# Patient Record
Sex: FEMALE | Race: WHITE | NOT HISPANIC OR LATINO | URBAN - METROPOLITAN AREA
[De-identification: names, ages, dates, MRNs, and addresses within clinical notes are randomized per-mention and may not be internally consistent; named-entity substitution may affect disease eponyms.]

---

## 2017-01-05 ENCOUNTER — OFFICE VISIT - HEALTHEAST (OUTPATIENT)
Dept: INTERNAL MEDICINE | Facility: CLINIC | Age: 52
End: 2017-01-05

## 2017-01-05 DIAGNOSIS — J40 BRONCHITIS: ICD-10-CM

## 2017-01-22 ENCOUNTER — COMMUNICATION - HEALTHEAST (OUTPATIENT)
Dept: INTERNAL MEDICINE | Facility: CLINIC | Age: 52
End: 2017-01-22

## 2017-01-22 DIAGNOSIS — I10 HTN (HYPERTENSION): ICD-10-CM

## 2017-01-27 ENCOUNTER — COMMUNICATION - HEALTHEAST (OUTPATIENT)
Dept: INTERNAL MEDICINE | Facility: CLINIC | Age: 52
End: 2017-01-27

## 2017-01-27 DIAGNOSIS — R05.9 COUGH: ICD-10-CM

## 2017-01-27 RX ORDER — ALBUTEROL SULFATE 90 UG/1
2 AEROSOL, METERED RESPIRATORY (INHALATION) EVERY 6 HOURS PRN
Qty: 1 INHALER | Refills: 1 | Status: SHIPPED | OUTPATIENT
Start: 2017-01-27

## 2017-01-31 ENCOUNTER — OFFICE VISIT - HEALTHEAST (OUTPATIENT)
Dept: INTERNAL MEDICINE | Facility: CLINIC | Age: 52
End: 2017-01-31

## 2017-01-31 DIAGNOSIS — R05.9 COUGH: ICD-10-CM

## 2017-02-07 ENCOUNTER — OFFICE VISIT - HEALTHEAST (OUTPATIENT)
Dept: INTERNAL MEDICINE | Facility: CLINIC | Age: 52
End: 2017-02-07

## 2017-02-07 DIAGNOSIS — R05.9 COUGH: ICD-10-CM

## 2017-02-09 ENCOUNTER — COMMUNICATION - HEALTHEAST (OUTPATIENT)
Dept: INTERNAL MEDICINE | Facility: CLINIC | Age: 52
End: 2017-02-09

## 2017-02-09 DIAGNOSIS — R05.9 COUGH: ICD-10-CM

## 2017-02-13 ENCOUNTER — COMMUNICATION - HEALTHEAST (OUTPATIENT)
Dept: ADMINISTRATIVE | Facility: CLINIC | Age: 52
End: 2017-02-13

## 2017-02-13 ENCOUNTER — RECORDS - HEALTHEAST (OUTPATIENT)
Dept: ADMINISTRATIVE | Facility: OTHER | Age: 52
End: 2017-02-13

## 2017-02-16 ENCOUNTER — AMBULATORY - HEALTHEAST (OUTPATIENT)
Dept: PULMONOLOGY | Facility: OTHER | Age: 52
End: 2017-02-16

## 2017-02-16 DIAGNOSIS — R05.9 COUGH: ICD-10-CM

## 2017-02-20 ENCOUNTER — COMMUNICATION - HEALTHEAST (OUTPATIENT)
Dept: PULMONOLOGY | Facility: OTHER | Age: 52
End: 2017-02-20

## 2017-02-22 ENCOUNTER — RECORDS - HEALTHEAST (OUTPATIENT)
Dept: ADMINISTRATIVE | Facility: OTHER | Age: 52
End: 2017-02-22

## 2017-02-22 ENCOUNTER — RECORDS - HEALTHEAST (OUTPATIENT)
Dept: PULMONOLOGY | Facility: OTHER | Age: 52
End: 2017-02-22

## 2017-02-22 DIAGNOSIS — R05.9 COUGH: ICD-10-CM

## 2017-02-24 ENCOUNTER — COMMUNICATION - HEALTHEAST (OUTPATIENT)
Dept: INTERNAL MEDICINE | Facility: CLINIC | Age: 52
End: 2017-02-24

## 2017-02-25 ENCOUNTER — COMMUNICATION - HEALTHEAST (OUTPATIENT)
Dept: INTERNAL MEDICINE | Facility: CLINIC | Age: 52
End: 2017-02-25

## 2017-02-27 ENCOUNTER — AMBULATORY - HEALTHEAST (OUTPATIENT)
Dept: PULMONOLOGY | Facility: OTHER | Age: 52
End: 2017-02-27

## 2017-02-27 ENCOUNTER — OFFICE VISIT - HEALTHEAST (OUTPATIENT)
Dept: PULMONOLOGY | Facility: OTHER | Age: 52
End: 2017-02-27

## 2017-02-27 DIAGNOSIS — K21.9 GERD (GASTROESOPHAGEAL REFLUX DISEASE): ICD-10-CM

## 2017-02-27 DIAGNOSIS — I10 HTN (HYPERTENSION): ICD-10-CM

## 2017-02-27 RX ORDER — LOSARTAN POTASSIUM 50 MG/1
50 TABLET ORAL DAILY
Qty: 30 TABLET | Refills: 3 | Status: SHIPPED | OUTPATIENT
Start: 2017-02-27

## 2017-03-01 ENCOUNTER — COMMUNICATION - HEALTHEAST (OUTPATIENT)
Dept: INTERNAL MEDICINE | Facility: CLINIC | Age: 52
End: 2017-03-01

## 2017-03-02 ENCOUNTER — COMMUNICATION - HEALTHEAST (OUTPATIENT)
Dept: ADMINISTRATIVE | Facility: CLINIC | Age: 52
End: 2017-03-02

## 2017-03-07 ENCOUNTER — COMMUNICATION - HEALTHEAST (OUTPATIENT)
Dept: PULMONOLOGY | Facility: OTHER | Age: 52
End: 2017-03-07

## 2017-03-09 ENCOUNTER — OFFICE VISIT - HEALTHEAST (OUTPATIENT)
Dept: INTERNAL MEDICINE | Facility: CLINIC | Age: 52
End: 2017-03-09

## 2017-03-09 DIAGNOSIS — R21 SKIN RASH: ICD-10-CM

## 2017-03-09 RX ORDER — TIOTROPIUM BROMIDE 18 UG/1
18 CAPSULE ORAL; RESPIRATORY (INHALATION) DAILY
Status: SHIPPED | COMMUNITY
Start: 2017-03-09

## 2017-03-09 RX ORDER — CLOBETASOL PROPIONATE 0.5 MG/G
CREAM TOPICAL
Qty: 45 G | Refills: 0 | Status: SHIPPED | OUTPATIENT
Start: 2017-03-09

## 2017-03-16 ENCOUNTER — COMMUNICATION - HEALTHEAST (OUTPATIENT)
Dept: INTERNAL MEDICINE | Facility: CLINIC | Age: 52
End: 2017-03-16

## 2017-03-20 ENCOUNTER — COMMUNICATION - HEALTHEAST (OUTPATIENT)
Dept: INTERNAL MEDICINE | Facility: CLINIC | Age: 52
End: 2017-03-20

## 2017-03-20 DIAGNOSIS — I10 ESSENTIAL HYPERTENSION: ICD-10-CM

## 2017-03-30 ENCOUNTER — OFFICE VISIT - HEALTHEAST (OUTPATIENT)
Dept: INTERNAL MEDICINE | Facility: CLINIC | Age: 52
End: 2017-03-30

## 2017-03-30 DIAGNOSIS — I10 ESSENTIAL HYPERTENSION WITH GOAL BLOOD PRESSURE LESS THAN 140/90: ICD-10-CM

## 2017-03-30 ASSESSMENT — MIFFLIN-ST. JEOR: SCORE: 2233.25

## 2017-03-31 ENCOUNTER — OFFICE VISIT - HEALTHEAST (OUTPATIENT)
Dept: PULMONOLOGY | Facility: OTHER | Age: 52
End: 2017-03-31

## 2017-03-31 DIAGNOSIS — R05.9 COUGH: ICD-10-CM

## 2017-03-31 DIAGNOSIS — K21.9 GERD (GASTROESOPHAGEAL REFLUX DISEASE): ICD-10-CM

## 2017-04-03 ENCOUNTER — AMBULATORY - HEALTHEAST (OUTPATIENT)
Dept: PULMONOLOGY | Facility: OTHER | Age: 52
End: 2017-04-03

## 2017-07-08 ENCOUNTER — COMMUNICATION - HEALTHEAST (OUTPATIENT)
Dept: INTERNAL MEDICINE | Facility: CLINIC | Age: 52
End: 2017-07-08

## 2017-07-08 DIAGNOSIS — I10 ESSENTIAL HYPERTENSION: ICD-10-CM

## 2017-07-08 RX ORDER — AMLODIPINE BESYLATE 10 MG/1
TABLET ORAL
Qty: 90 TABLET | Refills: 3 | Status: SHIPPED | OUTPATIENT
Start: 2017-07-08

## 2017-07-08 RX ORDER — SPIRONOLACTONE 25 MG/1
TABLET ORAL
Qty: 90 TABLET | Refills: 3 | Status: SHIPPED | OUTPATIENT
Start: 2017-07-08

## 2017-09-14 ENCOUNTER — COMMUNICATION - HEALTHEAST (OUTPATIENT)
Dept: INTERNAL MEDICINE | Facility: CLINIC | Age: 52
End: 2017-09-14

## 2017-09-14 DIAGNOSIS — I10 HTN (HYPERTENSION): ICD-10-CM

## 2017-09-15 RX ORDER — LISINOPRIL 40 MG/1
40 TABLET ORAL DAILY
Qty: 90 TABLET | Refills: 0 | Status: SHIPPED | OUTPATIENT
Start: 2017-09-15 | End: 2018-09-15

## 2019-01-05 ENCOUNTER — COMMUNICATION - HEALTHEAST (OUTPATIENT)
Dept: INTERNAL MEDICINE | Facility: CLINIC | Age: 54
End: 2019-01-05

## 2019-01-24 ENCOUNTER — RECORDS - HEALTHEAST (OUTPATIENT)
Dept: ADMINISTRATIVE | Facility: OTHER | Age: 54
End: 2019-01-24

## 2021-05-30 VITALS — BODY MASS INDEX: 59.83 KG/M2 | WEIGHT: 293 LBS

## 2021-05-30 VITALS — BODY MASS INDEX: 48.82 KG/M2 | HEIGHT: 65 IN | WEIGHT: 293 LBS

## 2021-05-30 VITALS — BODY MASS INDEX: 59.03 KG/M2 | WEIGHT: 293 LBS

## 2021-05-30 VITALS — BODY MASS INDEX: 59.46 KG/M2 | WEIGHT: 293 LBS

## 2021-05-30 VITALS — WEIGHT: 293 LBS | BODY MASS INDEX: 57.44 KG/M2

## 2021-05-30 VITALS — WEIGHT: 293 LBS | BODY MASS INDEX: 57.7 KG/M2

## 2021-06-08 NOTE — PROGRESS NOTES
"ASSESSMENT:    Bronchitis with reactive airway disease.    PLAN:  She is given prescriptions for both doxycycline and prednisone.  She'll follow-up next week if she is not significantly improved.  Problem List Items Addressed This Visit     None      Visit Diagnoses     Cough    -  Primary    Relevant Orders    XR Chest PA and Lateral          There are no discontinued medications.    No Follow-up on file.    There are no Patient Instructions on file for this visit.    CHIEF COMPLAINT:  Chief Complaint   Patient presents with     Cough     cough, wheezing, SOB, fever  x 6/d\"s       HISTORY OF PRESENT ILLNESS:  Radha Pablo is a 51 y.o. female presenting to the clinic today with a cough. She developed a productive cough with a fever for multiple days approximately one month ago. She presented to the clinic on January 5 and was seen by Dr. Irwin, who diagnosed her with bronchitis and treated her with a Flovent inhaler and a course of sulfa antibiotics. She completed her course of antibiotics at that time, as prescribed. Her symptoms improved considerably at that time but never fully resolved. Her symptoms began re-presenting 6 days ago and have progressively worsened since. Her present symptoms include a cough which produces yellow sputum, chest congestion, wheezing, shortness of breath, and frequent yellow rhinorrhea. She is agreeable to receiving a chest x-ray for further evaluation of her symptoms.     REVIEW OF SYSTEMS:   She denies sinus pressure or sinus pain. Her pulse oximetry saturation is currently 93%. All other systems are negative.    PFSH:  Allergies   Allergen Reactions     Hydrochlorothiazide      Annotation: hyponatremia         TOBACCO USE:  History   Smoking Status     Never Smoker   Smokeless Tobacco     Not on file       VITALS:  Vitals:    01/31/17 1344 01/31/17 1412   BP: 130/90    Patient Site: Right Arm    Patient Position: Sitting    Cuff Size: Adult Large    Pulse: 88    Temp: 99  F " (37.2  C)    TempSrc: Oral    SpO2:  93%   Weight: (!) 347 lb 4.8 oz (157.5 kg)      Wt Readings from Last 3 Encounters:   01/31/17 (!) 347 lb 4.8 oz (157.5 kg)   08/29/16 (!) 361 lb (163.7 kg)   07/07/16 (!) 356 lb 9.6 oz (161.8 kg)         PHYSICAL EXAM:  Constitutional:  Reveals an alert and oriented x3, pleasant female with no acute distress.   HEENT: Oropharynx is clear. No pain with percussion over her frontal or maxillary sinuses bilaterally.   CV: S1, S2, regular rate and rhythm, no murmurs, gallops, or rubs   LUNGS: Expiratory wheezes throughout  CHEST X-RAY: No infiltrates or effusions      ADDITIONAL HISTORY SUMMARIZED (FROM OLD RECORDS OR HISTORY FROM SOMEONE OTHER THAN THE PATIENT OR ANOTHER HEALTHCARE PROVIDER), COLONOSCOPY (2 TOTAL): Reviewed note from Dr. Irwin on January 5 regarding bronchitis.     DECISION TO OBTAIN EXTRA INFORMATION (OLD RECORDS REQUESTED OR HISTORY FROM ANOTHER PERSON OR ACCESSING CARE EVERYWHERE) (1 TOTAL):none    RADIOLOGY TESTS SUMMARIZED OR ORDERED (XRAY/CT/MRI/DXA/MAMMO) (1 TOTAL): Chest x-ray ordered.     LABS REVIEWED OR ORDERED (1 TOTAL): None.    MEDICINE TESTS SUMMARIZED OR ORDERED (EKG/ECHO/EGD) (1 TOTAL): none    INDEPENDENT REVIEW OF EKG OR X-RAY (2 EACH): Personally reviewed chest x-ray ordered today.       The visit lasted a total of 9 minutes face to face with the patient. Over 50% of the time was spent counseling and educating the patient about cough.    I, Mark Schreiber, am scribing for and in the presence of, Dr. Orville Palacios.    I, Dr. Orville Palacios, personally performed the services described in this documentation, as scribed by Mark Schreiber in my presence, and it is both accurate and complete.    MEDICATIONS:  Current Outpatient Prescriptions   Medication Sig Dispense Refill     albuterol (PROVENTIL HFA;VENTOLIN HFA) 90 mcg/actuation inhaler Inhale 2 puffs every 6 (six) hours as needed for wheezing. 1 Inhaler 1     amLODIPine (NORVASC) 10 MG tablet TAKE 1  TABLET BY MOUTH EVERY DAY 90 tablet 0     fluticasone (FLOVENT HFA) 110 mcg/actuation inhaler Inhale 2 puffs 2 (two) times a day. 1 Inhaler 0     lisinopril (PRINIVIL,ZESTRIL) 40 MG tablet TAKE 1 TABLET BY MOUTH DAILY. 90 tablet 1     spironolactone (ALDACTONE) 25 MG tablet TAKE 1 TABLET BY MOUTH EVERY DAY 90 tablet 0     Current Facility-Administered Medications   Medication Dose Route Frequency Provider Last Rate Last Dose     ipratropium-albuterol 0.5-2.5 mg/3 mL nebulizer solution 3 mL (DUO-NEB)  3 mL Nebulization Once Orville Palacios MD           Total data points: 5

## 2021-06-08 NOTE — PROGRESS NOTES
"Radha presents today for follow-up of a cough.  She presented last week and was diagnosed with reactive airway disease.  She was given both doxycycline and prednisone.  She states that she only had about a 20% improvement with the medications.  She is still having a significant raspy cough and her voice is starting to be affected.  About a year ago she had very similar symptoms and was diagnosed with vocal cord inflammation.  It took her almost 10 months to completely improve.  She is obviously looking to \"nip this in the bud\" before it gets to the point where she needs a long convalescence.    Objective: Vital signs are as per the EMR.  Gen. patient is alert pleasant and in no acute distress.  She appears healthy.    Assessment and plan: Cough which is quite similar to the cough that she had 12-18 months ago.  She is going to see an ENT physician through the [facility] system as recommended by her speech therapist.  I will await his or her recommendations.  If there is nothing much from ENT perspective to do I'm going to refer her to pulmonology.  "

## 2021-06-08 NOTE — PROGRESS NOTES
"ASSESSMENT/PLAN:  1. Bronchitis  Think she warrants treatment with both antibiotic as well as inhaled steroid.  Use the albuterol as needed.  - Spacer W/O Mask    Patient Instructions   Start taking sulfa antibiotic, one tablet twice daily for 10 days.    Start using Flovent inhaler, two puffs twice daily with a spacer for two weeks. Make sure to rinse your mouth out afterward. If you think you still have a cough after two weeks on the inhaler, you can continue for a total of 4 weeks.       Return if symptoms worsen or fail to improve.    CHIEF COMPLAINT:  Chief Complaint   Patient presents with     Cough     needs a note for work       HISTORY OF PRESENT ILLNESS:  Radha Pablo is a 51 y.o. female patient of Dr. Orville Palacios presenting to the clinic today for a cough. Since 1/01/17 she has had a fever and frequent coughing. Her cough has been productive with yellow phlegm. She is not currently taking montelukast. She started using an albuterol inhaler as of this week. She has had wheezing in the past. She has not been to work this entire week because she has been feeling so lousy. She waited almost an entire month last fall before she was seen for similar symptoms.     REVIEW OF SYSTEMS:   Comprehensive review of systems negative except as noted above.    PFSH:  Reviewed as above.     TOBACCO USE:  History   Smoking Status     Never Smoker   Smokeless Tobacco     Not on file       VITALS:  Vitals:    01/05/17 1529   BP: 128/74   Pulse: 72   Temp: 98.7  F (37.1  C)     Wt Readings from Last 3 Encounters:   08/29/16 (!) 361 lb (163.7 kg)   07/07/16 (!) 356 lb 9.6 oz (161.8 kg)   06/07/16 (!) 349 lb 11.2 oz (158.6 kg)     Estimated body mass index is 59.71 kg/(m^2) as calculated from the following:    Height as of 8/29/16: 5' 5.2\" (1.656 m).    Weight as of 8/29/16: 361 lb (163.7 kg).    PHYSICAL EXAM:  General Appearance: Alert, cooperative, no distress, appears stated age.  Lungs: Harsh cough. Some end expiratory " wheezing, both lung bases.  Psychiatric:  She has a normal mood and affect.     ADDITIONAL HISTORY SUMMARIZED (FROM OLD RECORDS OR HISTORY FROM SOMEONE OTHER THAN THE PATIENT OR ANOTHER HEALTHCARE PROVIDER) (2 TOTAL): None.    DECISION TO OBTAIN EXTRA INFORMATION (OLD RECORDS REQUESTED OR HISTORY FROM ANOTHER PERSON OR ACCESSING CARE EVERYWHERE) (1 TOTAL): None.     RADIOLOGY TESTS SUMMARIZED OR ORDERED (XRAY/CT/MRI/DXA) (1 TOTAL): None.    LABS REVIEWED OR ORDERED (1 TOTAL): None.    MEDICINE TESTS SUMMARIZED OR ORDERED (EKG/ECHO/COLONOSCOPY/EGD) (1 TOTAL): None.    INDEPENDENT REVIEW OF EKG OR X-RAY (2 EACH): None.       The visit lasted a total of 7 minutes face to face with the patient. Over 50% of the time was spent counseling and educating the patient about cough.    IAngelika, am scribing for and in the presence of, Dr. Irwin.    I, Dr. Irwin, personally performed the services described in this documentation, as scribed by Angelika Ku in my presence, and it is both accurate and complete.    MEDICATIONS:  Current Outpatient Prescriptions   Medication Sig Dispense Refill     albuterol (PROVENTIL HFA;VENTOLIN HFA) 90 mcg/actuation inhaler Inhale 2 puffs every 6 (six) hours as needed for wheezing. 1 Inhaler 1     amLODIPine (NORVASC) 10 MG tablet TAKE 1 TABLET BY MOUTH EVERY DAY 90 tablet 0     lisinopril (PRINIVIL,ZESTRIL) 40 MG tablet TAKE 1 TABLET BY MOUTH DAILY 90 tablet 0     spironolactone (ALDACTONE) 25 MG tablet TAKE 1 TABLET BY MOUTH EVERY DAY 90 tablet 0     Current Facility-Administered Medications   Medication Dose Route Frequency Provider Last Rate Last Dose     ipratropium-albuterol 0.5-2.5 mg/3 mL nebulizer solution 3 mL (DUO-NEB)  3 mL Nebulization Once Orville Palacios MD           Total data points: None.

## 2021-06-09 NOTE — PROGRESS NOTES
Office visit notes from today's visit with Dr. Varner faxed to [employer] at [phone] (claim No:  [id number]) AND to  at [facility] at [phone] attention: jonah Bills per patient request.  FALLON signed and scanned.

## 2021-06-09 NOTE — PROGRESS NOTES
ASSESSMENT:    Hypertension, controlled.    Chronic cough and vocal cord inflammation    PLAN:  She will continue her current antihypertensive regimen.  She is going to go back to work full time on 4/3/2017.  She has follow-up with pulmonology tomorrow.  Follow-up with us as needed.  Problem List Items Addressed This Visit     None          There are no discontinued medications.    No Follow-up on file.    There are no Patient Instructions on file for this visit.    CHIEF COMPLAINT:  Chief Complaint   Patient presents with     Hypertension      follow up on med change, possible labs      Routine Health Maintenance     Tdap        HISTORY OF PRESENT ILLNESS:  Radha Pablo is a 52 y.o. female presenting to the clinic today for follow up for her hypertension. Her blood pressure is currently at goal at 120/80. She is currently taking 10 mg of amlodipine, 50 mg of losartan, and 25 mg of spironolactone daily for her hypertension.     Chronic cough: She has been compliant with her cough medication regimen, as instructed by Dr. Varner on February 27, but has not noted an improvement in her cough as a result. She has continued to cough on a regular basis since her last office visit on March 9. She is following up with Dr. Varner tomorrow.     Health maintenance: She is due to receive a Tdap immunization.     REVIEW OF SYSTEMS:   She has begun losing her voice once again secondary to her chronic cough. All other systems are negative.    PFSH:  She requests medical approval to return to work full time on Monday, April 3. If she loses her voice again after resuming full-time work, she is planing to begin worker's compensation proceedings.   Allergies   Allergen Reactions     Hydrochlorothiazide      Annotation: hyponatremia         TOBACCO USE:  History   Smoking Status     Never Smoker   Smokeless Tobacco     Not on file       VITALS:  Vitals:    03/30/17 0835   BP: 128/80   Pulse: 84   Weight: (!) 359 lb (162.8 kg)   Height:  "5' 5.25\" (1.657 m)     Wt Readings from Last 3 Encounters:   03/30/17 (!) 359 lb (162.8 kg)   03/09/17 (!) 356 lb 14.4 oz (161.9 kg)   02/27/17 (!) 359 lb 8 oz (163.1 kg)         PHYSICAL EXAM:  Constitutional:  Reveals an alert and oriented x3, pleasant female with no acute distress.       ADDITIONAL HISTORY SUMMARIZED (FROM OLD RECORDS OR HISTORY FROM SOMEONE OTHER THAN THE PATIENT OR ANOTHER HEALTHCARE PROVIDER), COLONOSCOPY (2 TOTAL): none    DECISION TO OBTAIN EXTRA INFORMATION (OLD RECORDS REQUESTED OR HISTORY FROM ANOTHER PERSON OR ACCESSING CARE EVERYWHERE) (1 TOTAL):none    RADIOLOGY TESTS SUMMARIZED OR ORDERED (XRAY/CT/MRI/DXA/MAMMO) (1 TOTAL): none    LABS REVIEWED OR ORDERED (1 TOTAL): None.    MEDICINE TESTS SUMMARIZED OR ORDERED (EKG/ECHO/EGD) (1 TOTAL): none    INDEPENDENT REVIEW OF EKG OR X-RAY (2 EACH): none      The visit lasted a total of 6 minutes face to face with the patient. Over 50% of the time was spent counseling and educating the patient about chronic cough.    I, Mark Schreiber, am scribing for and in the presence of, Dr. Orville Palacios.    I, Dr. Orville Palacios, personally performed the services described in this documentation, as scribed by Mark Schreiber in my presence, and it is both accurate and complete.    MEDICATIONS:  Current Outpatient Prescriptions   Medication Sig Dispense Refill     albuterol (PROVENTIL HFA;VENTOLIN HFA) 90 mcg/actuation inhaler Inhale 2 puffs every 6 (six) hours as needed for wheezing. 1 Inhaler 1     amLODIPine (NORVASC) 10 MG tablet TAKE 1 TABLET BY MOUTH EVERY DAY 90 tablet 0     clobetasol (TEMOVATE) 0.05 % cream Use twice per day as needed 45 g 0     losartan (COZAAR) 50 MG tablet Take 1 tablet (50 mg total) by mouth daily. 30 tablet 3     omeprazole (PRILOSEC) 20 MG capsule Take 2 capsules (40 mg total) by mouth daily. 30 capsule 2     spironolactone (ALDACTONE) 25 MG tablet TAKE 1 TABLET BY MOUTH EVERY DAY 90 tablet 0     tiotropium (SPIRIVA) 18 mcg " inhalation capsule Place 18 mcg into inhaler and inhale daily.       Current Facility-Administered Medications   Medication Dose Route Frequency Provider Last Rate Last Dose     ipratropium-albuterol 0.5-2.5 mg/3 mL nebulizer solution 3 mL (DUO-NEB)  3 mL Nebulization Once Orville Palacios MD           Total data points: 0

## 2021-06-09 NOTE — PROGRESS NOTES
Patient instructed in use of Spiriva respimat inhaler.  Patient able to use the respimat device without any difficulty.  Return demo and first dose given in clinic.  Patient has phone numbers to call if any questions. Sample given (2.5mcg/actuation)

## 2021-06-09 NOTE — PROGRESS NOTES
"\"return to work\" forms signed by Dr. Varner and copies sent to patient's employer in envelope provided ([employer]).  Photo copy of form also mailed to patient for her records.  "

## 2021-06-09 NOTE — PROGRESS NOTES
ASSESSMENT:    Pruritic rash on the back of both legs.    PLAN:  I gave her some clobetasol cream to use.  Follow-up as needed.  Problem List Items Addressed This Visit     None          There are no discontinued medications.    No Follow-up on file.    There are no Patient Instructions on file for this visit.    CHIEF COMPLAINT:  Chief Complaint   Patient presents with     Leg Problem     itching on the back of both leg x 5/d's       HISTORY OF PRESENT ILLNESS:  Radha Pablo is a 51 y.o. female presenting to the clinic today with a skin rash on her legs bilaterally and for follow up for her cough. She is currently fasting.     Skin rash: She has developed multiple skin lesions over her legs bilaterally over the last 5 days. Her skin lesions are predominantly located over the posterior aspects of her legs bilaterally but have also presented on the anterior aspect of her right leg. She reports that her skin lesions were previously flat and while but have become rough and bumpy. Her lesions itch severely and are causing significant discomfort. Her skin lesions have produced blood on multiple occasions secondary to scratching. She denies any recent changes in skin lotions or hygiene products.     Cough: She presented her chronic cough symptoms to Dr. Murry in otolaryngology at [facility] on February 13, who cleared her from any otolaryngologic abnormalities, including no obvious rhinosinusitis or discrete laryngeal lesions, and affirmed her intent to present to pulmonology for further evaluation. She has since presented her symptoms to Dr. Varner in pulmonology, who informed her that her symptoms are likely secondary to one or more of the following: cough variant asthma, aspiration, postviral bronchitis, and/or ACE inhibitor induced cough. Dr. Varner recommended that she discontinue lisinopril and begin losartan, begin omeprazole 40 mg daily, avoid eating or drinking 2 hours prior to bed, begin  a trial of Spiriva, to continue on cough medication as needed, and to return for follow up in 4-6 weeks. She has been compliant with these instructions since. She has continued to be without a voice at the end of each day, even if she limits talking as much as possible.     Health maintenance: She is scheduled to return for a follow-up office visit for her chronic medical conditions on February 20.     REVIEW OF SYSTEMS:   She denies recent fevers. All other systems are negative.    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 She is attempting to receive approval to begin long term disability. She is considering moving to [place], [place] in the upcoming months.   Allergies   Allergen Reactions     Hydrochlorothiazide      Annotation: hyponatremia         TOBACCO USE:  History   Smoking Status     Never Smoker   Smokeless Tobacco     Not on file       VITALS:  Vitals:    03/09/17 0908   BP: 138/88   Patient Site: Right Arm   Patient Position: Sitting   Cuff Size: Adult Large   Pulse: 86   Weight: (!) 356 lb 14.4 oz (161.9 kg)     Wt Readings from Last 3 Encounters:   03/09/17 (!) 356 lb 14.4 oz (161.9 kg)   02/27/17 (!) 359 lb 8 oz (163.1 kg)   02/07/17 (!) 348 lb 14.4 oz (158.3 kg)         PHYSICAL EXAM:  Constitutional:  Reveals an alert and oriented x3, pleasant female with no acute distress.   SKIN: Fine red rash on both legs, right greater than left       ADDITIONAL HISTORY SUMMARIZED (FROM OLD RECORDS OR HISTORY FROM SOMEONE OTHER THAN THE PATIENT OR ANOTHER HEALTHCARE PROVIDER), COLONOSCOPY (2 TOTAL): Reviewed otolaryngology note from Dr. Murry in otolaryngology at [facility] on February 13 regarding chronic cough. Reviewed pulmonology note from Dr. Varner on February 27 regarding chronic cough.     DECISION TO OBTAIN EXTRA  INFORMATION (OLD RECORDS REQUESTED OR HISTORY FROM ANOTHER PERSON OR ACCESSING CARE EVERYWHERE) (1 TOTAL):none    RADIOLOGY TESTS SUMMARIZED OR ORDERED (XRAY/CT/MRI/DXA/MAMMO) (1 TOTAL): Reviewed chest x-ray report from January 31.     LABS REVIEWED OR ORDERED (1 TOTAL): Reviewed POCT hemoglobin from February 22.    MEDICINE TESTS SUMMARIZED OR ORDERED (EKG/ECHO/EGD) (1 TOTAL): Reviewed PFT report from February 22.     INDEPENDENT REVIEW OF EKG OR X-RAY (2 EACH): none      The visit lasted a total of 12 minutes face to face with the patient. Over 50% of the time was spent counseling and educating the patient about skin rash.    I, Mark Schreiber, am scribing for and in the presence of, Dr. Orville Palacios.    I, Dr. Orville Palacios, personally performed the services described in this documentation, as scribed by Mark Schreiber in my presence, and it is both accurate and complete.    MEDICATIONS:  Current Outpatient Prescriptions   Medication Sig Dispense Refill     albuterol (PROVENTIL HFA;VENTOLIN HFA) 90 mcg/actuation inhaler Inhale 2 puffs every 6 (six) hours as needed for wheezing. 1 Inhaler 1     amLODIPine (NORVASC) 10 MG tablet TAKE 1 TABLET BY MOUTH EVERY DAY 90 tablet 0     losartan (COZAAR) 50 MG tablet Take 1 tablet (50 mg total) by mouth daily. 30 tablet 3     omeprazole (PRILOSEC) 20 MG capsule Take 2 capsules (40 mg total) by mouth daily. 30 capsule 2     spironolactone (ALDACTONE) 25 MG tablet TAKE 1 TABLET BY MOUTH EVERY DAY 90 tablet 0     tiotropium (SPIRIVA) 18 mcg inhalation capsule Place 18 mcg into inhaler and inhale daily.       Current Facility-Administered Medications   Medication Dose Route Frequency Provider Last Rate Last Dose     ipratropium-albuterol 0.5-2.5 mg/3 mL nebulizer solution 3 mL (DUO-NEB)  3 mL Nebulization Once Orville Palacios MD           Total data points: 5

## 2021-06-09 NOTE — PROGRESS NOTES
CCx:Follow-up Chronic cough    HPI:Ms. Pablo is a 52 yo lady with a past medical history significant for ARDON, Leukocytoclastic vasculitis and HTN who presents to clinic today with the aforementioned chief complaint.  She was evaluated by me in clinic about a month ago and had developed these about a month prior to seeing me.  At that time she had been treated with antibiotics and prednisone with minimal improvement of her symptoms and described that she has had these symptoms previous spring as well and that they had responded to inhaled bronchodilators.  Notably her job at [employer] entails talking for much of the day and this was exacerbating her cough and also causing her to lose her voice.  Pulmonary function testing at the last clinic visit was unremarkable she was noted to be on lisinopril which we thought may have been worsening her symptoms because of which we stopped this.  Her coughing has improved and she is no longer using his Spiriva inhaler as she had a sample which ran out a week ago.  Notably the patient has been out of work for a month and states that even if she had a prescription for the medication she would not be able to afford this.  She continues to be compliant with acid reflux therapy.    ROS:  Review of Systems - General ROS: positive for  - fatigue  Psychological ROS: negative  Ophthalmic ROS: negative  ENT ROS: positive for - nasal discharge  Allergy and Immunology ROS: negative  Hematological and Lymphatic ROS: negative  Endocrine ROS: negative  Respiratory ROS: positive for - cough  Cardiovascular ROS: no chest pain or dyspnea on exertion  Gastrointestinal ROS: no abdominal pain, change in bowel habits, or black or bloody stools  Genito-Urinary ROS: no dysuria, trouble voiding, or hematuria  Musculoskeletal ROS: negative  Neurological ROS: no TIA or stroke symptoms  Dermatological ROS: negative    PMH (unchanged from prior clinic visit):  1. ARDON  2. Leukocytoclastic  vasculitis  3. Hypertension  4. Morbid obesity    PSH (unchanged from prior clinic visit):  1. Bilateral knee replacements    Allergies (unchanged from prior clinic visit):  Reviewed in [facility]    Family HX (unchanged from prior clinic visit):  1. Mother: COPD, status post lung transplant now   2. Father: COPD, hypertension now   3. P. Grandmother: COPD    Social Hx (unchanged from prior clinic visit):  Marital status: Single  Number of children: 0  Resides in a 45 yr old apartment, no concern for mold.  Occupational history: Works at a call center for [employer]   service: No  Pets: No  Smoking history: 0 pack year history  Alcohol use: 12 pack of beer per week   Recreational drug use: No  Hobbies: None  Recent Travel: No    Current Meds:  Current Outpatient Prescriptions   Medication Sig Dispense Refill     albuterol (PROVENTIL HFA;VENTOLIN HFA) 90 mcg/actuation inhaler Inhale 2 puffs every 6 (six) hours as needed for wheezing. 1 Inhaler 1     amLODIPine (NORVASC) 10 MG tablet TAKE 1 TABLET BY MOUTH EVERY DAY 90 tablet 0     clobetasol (TEMOVATE) 0.05 % cream Use twice per day as needed 45 g 0     losartan (COZAAR) 50 MG tablet Take 1 tablet (50 mg total) by mouth daily. 30 tablet 3     omeprazole (PRILOSEC) 20 MG capsule Take 2 capsules (40 mg total) by mouth daily. 30 capsule 2     spironolactone (ALDACTONE) 25 MG tablet TAKE 1 TABLET BY MOUTH EVERY DAY 90 tablet 0     tiotropium (SPIRIVA) 18 mcg inhalation capsule Place 18 mcg into inhaler and inhale daily.       Current Facility-Administered Medications   Medication Dose Route Frequency Provider Last Rate Last Dose     ipratropium-albuterol 0.5-2.5 mg/3 mL nebulizer solution 3 mL (DUO-NEB)  3 mL Nebulization Once Orville Palacios MD             Labs (unchanged from prior clinic visit):  Reviewed.    Imaging studies (unchanged from prior clinic visit):  CXR 17:  No significant change. No focal consolidation. Mild  asymmetric elevation of the right hemidiaphragm. No pleural effusion. Upper normal heart size.    PFT's 2/22/17 (unchanged from prior clinic visit):  FEV1/FVC is 83% and is normal.  FEV1 is 2.27L (78%) predicted and is normal.  FVC is 2.72L (73%) predicted and normal.  There was no improvement in spirometry after a single inhaled dose of bronchodilator.  TLC is 4.76L (92%) predicted and is normal.  RV is 1.84L (102%) predicted and is normal.  DLCO is 22.70ml/min/hg (97%) predicted and is normal when it is corrected for hemoglobin.  Flow volume loops indicate no abnormalities.    Impression:  Full Pulmonary Function Test is normal.  PFTs are consistent with no obstructive disease.  Spirometry is not consistent with reversibility.  There is no hyperinflation.  There is no air-trapping.  Diffusion capacity when corrected for hemoglobin is normal.     Physical Exam:  /62  Pulse (!) 106  Resp 18  Wt (!) 362 lb 4.8 oz (164.3 kg)  SpO2 96% Comment: RA  BMI 59.83 kg/m2  Physical Exam   Constitutional: She is oriented to person, place, and time. She appears well-developed and well-nourished.   Morbidly obese very pleasant lady.   HENT:   Head: Normocephalic and atraumatic.   Eyes: Conjunctivae are normal. Pupils are equal, round, and reactive to light.   Neck: Normal range of motion.   Cardiovascular: Normal rate and regular rhythm.    Pulmonary/Chest: Effort normal and breath sounds normal. No respiratory distress.   Abdominal: Soft.   Musculoskeletal: Normal range of motion.   Neurological: She is alert and oriented to person, place, and time.   Skin: Skin is warm and dry.   Psychiatric: She has a normal mood and affect. Her behavior is normal. Judgment and thought content normal.         Assessment and Plan:Radha Pablo is a 52 y.o. with a past medical history significant for ARDON, Leukocytoclastic vasculitis, morbid obesity and HTN who presents to clinic today for evaluation of a chronic cough with normal  pulmonary function testing and unremarkable chest imaging studies. The likely etiology of her  symptoms includes; cough variant asthma, aspiration, postviral bronchitis and lastly ACE inhibitor induced cough--symptoms appear to have improved with discontinuation of lisinopril and also a tincture of time. For the present we would recommend;    1. Cough: Etiology is as above.     Continue omeprazole 40 mg a day.    I have instructed her to be sure not to eat or drink anything for at least 2 hours prior to lying down and to continue to elevate the head end of her bed while she sleeps.    She ran out of her Spiriva sample and was not sure if or not this actually made a significant difference.  I have instructed her that should her symptoms worsen again, she should come to clinic and  more samples as she is currently unable to afford purchasing the inhalers.    Continue as needed cough medication.    Continue losartan.  2. HCM: Discussed with the patient that she would benefit from weight loss and the fact that her we could certainly be precipitating silent GERD.  3. Follow-up:  3 Months.      Nciole Varner  Pulmonary and Critical Care  [id number]

## 2021-07-03 NOTE — ADDENDUM NOTE
Addendum Note by Orville Palacios MD at 2/22/2017 11:01 AM     Author: Orville Palacios MD Service: -- Author Type: Physician    Filed: 2/22/2017 11:01 AM Encounter Date: 2/9/2017 Status: Signed    : Orville Palacios MD (Physician)    Addended by: ORVILLE PALACIOS on: 2/22/2017 11:01 AM        Modules accepted: Orders

## 2021-08-21 ENCOUNTER — HEALTH MAINTENANCE LETTER (OUTPATIENT)
Age: 56
End: 2021-08-21

## 2021-10-16 ENCOUNTER — HEALTH MAINTENANCE LETTER (OUTPATIENT)
Age: 56
End: 2021-10-16

## 2022-09-25 ENCOUNTER — HEALTH MAINTENANCE LETTER (OUTPATIENT)
Age: 57
End: 2022-09-25

## 2023-10-14 ENCOUNTER — HEALTH MAINTENANCE LETTER (OUTPATIENT)
Age: 58
End: 2023-10-14